# Patient Record
Sex: MALE | Race: WHITE
[De-identification: names, ages, dates, MRNs, and addresses within clinical notes are randomized per-mention and may not be internally consistent; named-entity substitution may affect disease eponyms.]

---

## 2017-06-09 NOTE — OPERATIVE NOTE
SURGEON: 

Baljit Curry MD



ANESTHESIA:

General. 



ESTIMATED BLOOD LOSS:

Minimal.



COMPLICATIONS: 

None. 



APPARENT FINDINGS:

A 2 cm umbilical hernia containing fat.  



PREOPERATIVE DIAGNOSIS:

Umbilical hernia.



POSTOPERATIVE DIAGNOSIS:

Umbilical hernia.



PROCEDURE PERFORMED: 

Repair of umbilical hernia with mesh.



INDICATIONS FOR PROCEDURE: 

This is a 41-year-old man with an increasingly symptomatic umbilical hernia.



DESCRIPTION OF PROCEDURE:

The patient is brought to the operating room with general anesthesia achieved.  
The abdomen was prepped and draped.  A curvilinear incision was made along the 
inferior border of the umbilicus and carried to the fascia.  The hernia sac was 
opened.  He had some herniated fat which was reduced.  There was about a 2  to 
2.5 cm defect.  A medium Ventralex was used to repair the defect.  It was 
placed beneath the fascia and secured with interrupted 0-Prolene sutures.  The 
wound was irrigated and suctioned.  There was no bleeding.  The base of the 
umbilicus was tacked to the fascia with a 2-0 Vicryl  suture.  The skin was 
closed with running 4-0 Vicryl subcuticular sutures.  Steri-Strips and a 
dressing were placed over the incision.  The patient was awakened and extubated 
in the operating room and taken to the recovery room in good condition.  





cc:   Dr. Chrissy FITZGERALD

## 2018-01-19 ENCOUNTER — HOSPITAL ENCOUNTER (EMERGENCY)
Dept: HOSPITAL 44 - ED | Age: 43
Discharge: HOME | End: 2018-01-19
Payer: COMMERCIAL

## 2018-01-19 VITALS — DIASTOLIC BLOOD PRESSURE: 74 MMHG | SYSTOLIC BLOOD PRESSURE: 120 MMHG

## 2018-01-19 DIAGNOSIS — J09.X2: Primary | ICD-10-CM

## 2018-01-19 LAB
EGFR (AFRICAN): > 60
EGFR (NON-AFRICAN): > 60
EOSINOPHIL NFR BLD: 2 % (ref 0–7)
MCH RBC QN AUTO: 31.3 PG (ref 28–34)
MCV RBC AUTO: 90.5 FL (ref 80–100)
MONOCYTES %: 5 % (ref 0–11)
SEGMENTED NEUTROPHILS %: 86 % (ref 39–79)

## 2018-01-19 PROCEDURE — 85025 COMPLETE CBC W/AUTO DIFF WBC: CPT

## 2018-01-19 PROCEDURE — 80053 COMPREHEN METABOLIC PANEL: CPT

## 2018-01-19 PROCEDURE — S1016 NON-PVC INTRAVENOUS ADMINIST: HCPCS

## 2018-01-19 PROCEDURE — 96365 THER/PROPH/DIAG IV INF INIT: CPT

## 2018-01-19 PROCEDURE — 87400 INFLUENZA A/B EACH AG IA: CPT

## 2018-01-19 PROCEDURE — 99282 EMERGENCY DEPT VISIT SF MDM: CPT

## 2024-11-04 NOTE — ED PHYSICIAN DOCUMENTATION
Upper Respiratory Symptoms





- HISTORIAN


Historian: patient





- HPI


Stated Complaint: FEVER


Chief Complaint: Cough/ Upper Respiratory


Additional Information: 





cough congestion fever  chills ache all over-eat/sleeps  bowels/kidneys = ok


Onset: days ago (3)


Duration: intermittent episodes


Context: other (hadd flu vaccine).  denies: recent foreign travel, insect bite(s

)


Worsened by Deep Breath: Yes





- ROS


CONST/EYES: weakness.  denies: eye redness, eye itching


CVS/RESP: none, chest pain, shortness of breath, palpitations


LYMPH: leg swelling, rash, swollen glands


GI/: denies: problems urinating


NEURO/PSYCH: denies: fainting, dizziness, confusion, anxiety


MS/SKIN: joint pain, muscle aches.  denies: rash





- PAST HX


Lung Disease: asthma


Surgeries/Procedures: appendectomy


Immunizations: influenza, UTD


Allergies/Adverse Reactions: 


 Allergies











Allergy/AdvReac Type Severity Reaction Status Date / Time


 


No Known Drug Allergies Allergy   Verified 01/19/18 06:28

















- SOCIAL HX


Smoking History: non-smoker


Alcohol Use: none


Drug Use: none





- FAMILY HX


Family History: no significant history





- VITAL SIGNS


Vital Signs: 





 Vital Signs











Temp Pulse Resp BP Pulse Ox


 


 101.6 F H  115 H  18   125/81   98 


 


 01/19/18 06:01  01/19/18 06:01  01/19/18 06:01  01/19/18 06:01  01/19/18 06:01














- REVIEWED ASSESSMENTS


Nursing Assessment  Reviewed: Yes


Vitals Reviewed: Yes





ED Results Lab/Radiology





- Lab Results


Lab Results: 





 Lab Results











  01/19/18 01/19/18





  06:10 06:10


 


WBC    13.40 K/ul H K/ul





    (4.00-12.00) 


 


RBC    4.71 M/ul M/ul





    (3.90-5.20) 


 


Hgb    14.7 g/dL g/dL





    (12.0-18.0) 


 


Hct    42.6 % %





    (37.0-53.0) 


 


MCV    90.5 fl fl





    (80.0-100.0) 


 


MCH    31.3 pg pg





    (28.0-34.0) 


 


MCHC    34.5 g/dL g/dL





    (30.0-36.0) 


 


RDW    12.9 % %





    (11.3-14.3) 


 


Plt Count    276 K/mm3 K/mm3





    (130-400) 


 


Sodium  142 mmol/L mmol/L  





   (136-145)  


 


Potassium  3.7 mmol/L mmol/L  





   (3.5-5.1)  


 


Chloride  103 mmol/L mmol/L  





   ()  


 


Carbon Dioxide  25 mmol/L mmol/L  





   (22-30)  


 


BUN  20 mg/dL mg/dL  





   (9-20)  


 


Creatinine  1.10 mg/dL mg/dL  





   (0.66-1.25)  


 


Estimated Creat Clear  131   





   


 


Est GFR ( Amer)  > 60   





  (60 - ) 


 


Est GFR (Non-Af Amer)  > 60   





  (60 - ) 


 


Glucose  112 mg/dL H mg/dL  





   ()  


 


Calcium  9.3 mg/dL mg/dL  





   (8.4-10.2)  


 


Total Bilirubin  0.4 mg/dL mg/dL  





   (0.2-1.3)  


 


AST  22 U/L U/L  





   (15-46)  


 


ALT  39 U/L U/L  





   (13-69)  


 


Alkaline Phosphatase  63 U/L U/L  





   ()  


 


Total Protein  7.5 g/dL g/dL  





   (6.3-8.2)  


 


Albumin  4.1 g/dL g/dL  





   (3.5-5.0)  








type a flu titer pos





- Orders


Orders: 





 ED Orders











 Category Date Time Status


 


 Place IV Lock 1T Care  01/19/18 06:08 Active


 


 CBC/PLATELET/DIFF Routine Lab  01/19/18 06:10 Received


 


 CMP [CMP] Routine Lab  01/19/18 06:10 Received


 


 INFLUENZA A&B Stat Lab  01/19/18 06:01 Ordered


 


 UA [URINALYSIS] Routine Lab  01/19/18 Ordered


 


 0.9 % Sodium Chloride [Normal Saline] 1,000 ml Med  01/19/18 06:11 Discontinued





 IV .STK-MED   


 


 0.9 % Sodium Chloride [Normal Saline] 1,000 ml Med  01/19/18 06:10 Active





 IV Q1H   


 


 EKG WITH COMPARISON Stat Ther  01/19/18 Ordered














Upper Respiratory Symptoms





- EXAM


General Appearance: moderate distress


EENT: eyes nml inspection, TM erythema.  No: pharynx nml


Neck: normal inspection.  No: carotid bruit


Respiratory: no resp. distress, breath sounds nml, wheezes (mild).  No: 

accessory muscle use, decreased air movement


Abdomen: non-tender


CVS: heart sounds normal, tachycardia (111 on ecg).  No: frequent extrasystoles


Skin: color nml, no rash, warm,dry.  No: cyanosis, diaphoresis, pallor


Extremities: non-tender, normal range of motion, no evidence of injury


Neuro/Psych: oriented x3, mood/affect nml





Discharge


Clincal Impression: 


 flu type A





Referrals: 


Chrissy Doyle MD [Primary Care Provider] - 2 Days


Comments: 





home rest hi fluids bal nutrition


Condition: Good


Disposition: 01 HOME, SELF-CARE


Decision to Admit: NO


Decision Time: 06:59
Awake/Alert/Cooperative